# Patient Record
Sex: FEMALE | Race: WHITE | HISPANIC OR LATINO | Employment: OTHER | URBAN - METROPOLITAN AREA
[De-identification: names, ages, dates, MRNs, and addresses within clinical notes are randomized per-mention and may not be internally consistent; named-entity substitution may affect disease eponyms.]

---

## 2022-09-17 ENCOUNTER — HOSPITAL ENCOUNTER (EMERGENCY)
Facility: MEDICAL CENTER | Age: 77
End: 2022-09-17
Attending: EMERGENCY MEDICINE
Payer: MEDICARE

## 2022-09-17 ENCOUNTER — APPOINTMENT (OUTPATIENT)
Dept: RADIOLOGY | Facility: MEDICAL CENTER | Age: 77
End: 2022-09-17

## 2022-09-17 ENCOUNTER — APPOINTMENT (OUTPATIENT)
Dept: RADIOLOGY | Facility: MEDICAL CENTER | Age: 77
End: 2022-09-17
Attending: EMERGENCY MEDICINE

## 2022-09-17 VITALS
HEIGHT: 62 IN | OXYGEN SATURATION: 98 % | WEIGHT: 148.81 LBS | SYSTOLIC BLOOD PRESSURE: 193 MMHG | HEART RATE: 63 BPM | DIASTOLIC BLOOD PRESSURE: 90 MMHG | RESPIRATION RATE: 20 BRPM | BODY MASS INDEX: 27.38 KG/M2 | TEMPERATURE: 98.3 F

## 2022-09-17 DIAGNOSIS — R53.1 GENERALIZED WEAKNESS: ICD-10-CM

## 2022-09-17 DIAGNOSIS — R07.89 CHEST DISCOMFORT: ICD-10-CM

## 2022-09-17 DIAGNOSIS — R60.9 PERIPHERAL EDEMA: ICD-10-CM

## 2022-09-17 DIAGNOSIS — R63.0 DECREASED APPETITE: ICD-10-CM

## 2022-09-17 LAB
ALBUMIN SERPL BCP-MCNC: 4 G/DL (ref 3.2–4.9)
ALBUMIN/GLOB SERPL: 1.3 G/DL
ALP SERPL-CCNC: 87 U/L (ref 30–99)
ALT SERPL-CCNC: 22 U/L (ref 2–50)
ANION GAP SERPL CALC-SCNC: 16 MMOL/L (ref 7–16)
AST SERPL-CCNC: 24 U/L (ref 12–45)
BASOPHILS # BLD AUTO: 0.7 % (ref 0–1.8)
BASOPHILS # BLD: 0.05 K/UL (ref 0–0.12)
BILIRUB SERPL-MCNC: 0.3 MG/DL (ref 0.1–1.5)
BUN SERPL-MCNC: 17 MG/DL (ref 8–22)
CALCIUM SERPL-MCNC: 9.6 MG/DL (ref 8.5–10.5)
CHLORIDE SERPL-SCNC: 104 MMOL/L (ref 96–112)
CO2 SERPL-SCNC: 23 MMOL/L (ref 20–33)
CREAT SERPL-MCNC: 1.1 MG/DL (ref 0.5–1.4)
EKG IMPRESSION: NORMAL
EOSINOPHIL # BLD AUTO: 0.18 K/UL (ref 0–0.51)
EOSINOPHIL NFR BLD: 2.6 % (ref 0–6.9)
ERYTHROCYTE [DISTWIDTH] IN BLOOD BY AUTOMATED COUNT: 45.2 FL (ref 35.9–50)
GFR SERPLBLD CREATININE-BSD FMLA CKD-EPI: 52 ML/MIN/1.73 M 2
GLOBULIN SER CALC-MCNC: 3.1 G/DL (ref 1.9–3.5)
GLUCOSE SERPL-MCNC: 193 MG/DL (ref 65–99)
HCT VFR BLD AUTO: 39.3 % (ref 37–47)
HGB BLD-MCNC: 12.8 G/DL (ref 12–16)
IMM GRANULOCYTES # BLD AUTO: 0.05 K/UL (ref 0–0.11)
IMM GRANULOCYTES NFR BLD AUTO: 0.7 % (ref 0–0.9)
LIPASE SERPL-CCNC: 24 U/L (ref 11–82)
LYMPHOCYTES # BLD AUTO: 2.19 K/UL (ref 1–4.8)
LYMPHOCYTES NFR BLD: 31.5 % (ref 22–41)
MCH RBC QN AUTO: 28.8 PG (ref 27–33)
MCHC RBC AUTO-ENTMCNC: 32.6 G/DL (ref 33.6–35)
MCV RBC AUTO: 88.3 FL (ref 81.4–97.8)
MONOCYTES # BLD AUTO: 0.4 K/UL (ref 0–0.85)
MONOCYTES NFR BLD AUTO: 5.8 % (ref 0–13.4)
NEUTROPHILS # BLD AUTO: 4.08 K/UL (ref 2–7.15)
NEUTROPHILS NFR BLD: 58.7 % (ref 44–72)
NRBC # BLD AUTO: 0 K/UL
NRBC BLD-RTO: 0 /100 WBC
NT-PROBNP SERPL IA-MCNC: 1157 PG/ML (ref 0–125)
PLATELET # BLD AUTO: 228 K/UL (ref 164–446)
PMV BLD AUTO: 11.4 FL (ref 9–12.9)
POTASSIUM SERPL-SCNC: 4.2 MMOL/L (ref 3.6–5.5)
PROT SERPL-MCNC: 7.1 G/DL (ref 6–8.2)
RBC # BLD AUTO: 4.45 M/UL (ref 4.2–5.4)
SODIUM SERPL-SCNC: 143 MMOL/L (ref 135–145)
TROPONIN T SERPL-MCNC: 20 NG/L (ref 6–19)
TROPONIN T SERPL-MCNC: 21 NG/L (ref 6–19)
WBC # BLD AUTO: 7 K/UL (ref 4.8–10.8)

## 2022-09-17 PROCEDURE — 84484 ASSAY OF TROPONIN QUANT: CPT

## 2022-09-17 PROCEDURE — 80053 COMPREHEN METABOLIC PANEL: CPT

## 2022-09-17 PROCEDURE — 700105 HCHG RX REV CODE 258: Performed by: EMERGENCY MEDICINE

## 2022-09-17 PROCEDURE — 71045 X-RAY EXAM CHEST 1 VIEW: CPT

## 2022-09-17 PROCEDURE — 36415 COLL VENOUS BLD VENIPUNCTURE: CPT

## 2022-09-17 PROCEDURE — 83690 ASSAY OF LIPASE: CPT

## 2022-09-17 PROCEDURE — 85025 COMPLETE CBC W/AUTO DIFF WBC: CPT

## 2022-09-17 PROCEDURE — 93005 ELECTROCARDIOGRAM TRACING: CPT | Performed by: EMERGENCY MEDICINE

## 2022-09-17 PROCEDURE — 93005 ELECTROCARDIOGRAM TRACING: CPT

## 2022-09-17 PROCEDURE — 99285 EMERGENCY DEPT VISIT HI MDM: CPT

## 2022-09-17 PROCEDURE — 83880 ASSAY OF NATRIURETIC PEPTIDE: CPT

## 2022-09-17 RX ORDER — SODIUM CHLORIDE 9 MG/ML
500 INJECTION, SOLUTION INTRAVENOUS ONCE
Status: COMPLETED | OUTPATIENT
Start: 2022-09-17 | End: 2022-09-17

## 2022-09-17 RX ORDER — MIRTAZAPINE 30 MG/1
30 TABLET, FILM COATED ORAL EVERY EVENING
COMMUNITY

## 2022-09-17 RX ORDER — TELMISARTAN AND HYDROCHLORTHIAZIDE 80; 12.5 MG/1; MG/1
1 TABLET ORAL 2 TIMES DAILY
COMMUNITY

## 2022-09-17 RX ADMIN — SODIUM CHLORIDE 500 ML: 9 INJECTION, SOLUTION INTRAVENOUS at 16:34

## 2022-09-17 NOTE — ED PROVIDER NOTES
"ED Provider Note    CHIEF COMPLAINT  Chief Complaint   Patient presents with    Weakness     PT reports 3 days of worsening weakness because she feels bad and unable to eat.    Arm Pain     PT reports \"burning to B arms and chest\" x 3 days    Peripheral Edema     B LE swelling, pt has h/o PAD.       HPI  Maria Elana Reyes-Decorral is a 77 y.o. female who presents for evaluation of discomfort in the left side of her chest for the past 3 days.  She describes discomfort as a warmth sensation, it is not necessarily painful nor pressure nor sharp or achy or dull, it is a heat-like sensation.  It actually starts in her abdomen, radiates up through the left side of her chest into her left arm.  Is been present for the past 3 days, she had similar symptoms in the past most recently a month ago.  She is somewhat short of breath.  Over this timeframe she had increasing swelling of her legs with a history of vascular disease.  She is diabetic on metformin does not check her sugars regularly.  No fever.  No coughing.  She states that when she gets these episodes she feels generally weak and today is no exception.  No vomiting but states she has not had much to eat in the past 3 days she is just not hungry.  No diarrhea.  No burning or blood with urination.    REVIEW OF SYSTEMS  Negative for fever, rash, abdominal pain, nausea, vomiting, diarrhea, headache, focal weakness, focal numbness, focal tingling, back pain. All other systems are negative.     PAST MEDICAL HISTORY  No past medical history on file.    FAMILY HISTORY  No family history on file.    SOCIAL HISTORY       SURGICAL HISTORY  No past surgical history on file.    CURRENT MEDICATIONS  I personally reviewed the medication list in the charting documentation.     ALLERGIES  No Known Allergies    MEDICAL RECORD  I have reviewed patient's medical record and pertinent results are listed above.      PHYSICAL EXAM  VITAL SIGNS: BP (!) 179/85   Pulse 72   Temp 36.5 °C " "(97.7 °F) (Temporal)   Resp 18   Ht 1.575 m (5' 2\")   Wt 67.5 kg (148 lb 13 oz)   SpO2 97%   BMI 27.22 kg/m²    Constitutional: Elderly, generally well appearing patient in no acute distress.  Awake and alert, not toxic nor ill in appearance.  HENT: Normocephalic, no obvious evidence of acute trauma.  Eyes: No scleral icterus. Normal conjunctiva   Neck: Comfortable movement without any obvious restriction in the range of motion.  Cardiovascular: Upon ascultation I appreciate a regular heart rhythm and a normal rate.   Thorax & Lungs: Normal nonlabored respirations.  Upon application of the stethoscope for auscultation I find there to be no associated chest wall tenderness.  I appreciate no wheezing, rhonchi or rales. There is normal air movement.    Abdomen: The abdomen is not visibly distended. Upon palpation, I find it to be without tenderness.  No mass appreciated.  Skin: The exposed portions of skin reveal no obvious rash or other abnormalities.  Extremities/Musculoskeletal: Symmetric 1+ lower extremity pitting edema, no asymmetry, no erythema  Neurologic: Alert & oriented. No focal deficits observed.   Psychiatric: Normal affect appropriate for the clinical situation.    DIAGNOSTIC STUDIES / PROCEDURES    LABS/EKGs     Results for orders placed or performed during the hospital encounter of 09/17/22   CBC with Differential   Result Value Ref Range    WBC 7.0 4.8 - 10.8 K/uL    RBC 4.45 4.20 - 5.40 M/uL    Hemoglobin 12.8 12.0 - 16.0 g/dL    Hematocrit 39.3 37.0 - 47.0 %    MCV 88.3 81.4 - 97.8 fL    MCH 28.8 27.0 - 33.0 pg    MCHC 32.6 (L) 33.6 - 35.0 g/dL    RDW 45.2 35.9 - 50.0 fL    Platelet Count 228 164 - 446 K/uL    MPV 11.4 9.0 - 12.9 fL    Neutrophils-Polys 58.70 44.00 - 72.00 %    Lymphocytes 31.50 22.00 - 41.00 %    Monocytes 5.80 0.00 - 13.40 %    Eosinophils 2.60 0.00 - 6.90 %    Basophils 0.70 0.00 - 1.80 %    Immature Granulocytes 0.70 0.00 - 0.90 %    Nucleated RBC 0.00 /100 WBC    " Neutrophils (Absolute) 4.08 2.00 - 7.15 K/uL    Lymphs (Absolute) 2.19 1.00 - 4.80 K/uL    Monos (Absolute) 0.40 0.00 - 0.85 K/uL    Eos (Absolute) 0.18 0.00 - 0.51 K/uL    Baso (Absolute) 0.05 0.00 - 0.12 K/uL    Immature Granulocytes (abs) 0.05 0.00 - 0.11 K/uL    NRBC (Absolute) 0.00 K/uL   Complete Metabolic Panel (CMP)   Result Value Ref Range    Sodium 143 135 - 145 mmol/L    Potassium 4.2 3.6 - 5.5 mmol/L    Chloride 104 96 - 112 mmol/L    Co2 23 20 - 33 mmol/L    Anion Gap 16.0 7.0 - 16.0    Glucose 193 (H) 65 - 99 mg/dL    Bun 17 8 - 22 mg/dL    Creatinine 1.10 0.50 - 1.40 mg/dL    Calcium 9.6 8.5 - 10.5 mg/dL    AST(SGOT) 24 12 - 45 U/L    ALT(SGPT) 22 2 - 50 U/L    Alkaline Phosphatase 87 30 - 99 U/L    Total Bilirubin 0.3 0.1 - 1.5 mg/dL    Albumin 4.0 3.2 - 4.9 g/dL    Total Protein 7.1 6.0 - 8.2 g/dL    Globulin 3.1 1.9 - 3.5 g/dL    A-G Ratio 1.3 g/dL   Troponin   Result Value Ref Range    Troponin T 21 (H) 6 - 19 ng/L   ESTIMATED GFR   Result Value Ref Range    GFR (CKD-EPI) 52 (A) >60 mL/min/1.73 m 2   TROPONIN   Result Value Ref Range    Troponin T 20 (H) 6 - 19 ng/L   LIPASE   Result Value Ref Range    Lipase 24 11 - 82 U/L   proBrain Natriuretic Peptide, NT   Result Value Ref Range    NT-proBNP 1157 (H) 0 - 125 pg/mL   EKG   Result Value Ref Range    Report       [facility] Emergency Dept.    Test Date:  2022  Pt Name:    MARIA REYES-NUZHAT         Department: ER  MRN:        [id number]                      Room:  Gender:     Female                       Technician: [id number]  :        1945                   Requested By:ER TRIAGE PROTOCOL  Order #:    [id number]                    Reading MD: MIGUE VALERA MD    Measurements  Intervals                                Axis  Rate:       70                           P:          56  WI:         124                          QRS:        -14  QRSD:       102                          T:          89  QT:         390  QTc:         421    Interpretive Statements  12 Lead EKG interpreted by me to show: -- Rate 70 -- Rhythm: Normal sinus  rhythm  -- Axis: Normal -- NJ and QRS Intervals: Normal -- T waves: No acute changes  --  ST segments: No acute changes -- Ectopy: PVCs. My impression of this EKG:  Does  not indicate acute ischemia at this time.  Electronically Signed On 9-  16:43:20 PDT by MIGUE VALERA MD          RADIOLOGY     DX-CHEST-PORTABLE (1 VIEW)   Final Result      No acute cardiopulmonary disease.            COURSE & MEDICAL DECISION MAKING  I have reviewed any medical record information, laboratory studies and radiographic results as noted above.  Differential diagnoses includes: ACS, dehydration, electrolyte abnormalities, anemia, dehydration, DKA, CHF, pleural effusion, hepatitis, pancreatitis    Encounter Summary: This is a very pleasant 77 y.o. female who unfortunately required evaluation in the emergency department today with some vague symptoms including a warmth on the left side of her chest into her arm with decreased appetite and generalized weakness over the past 3 days.  Her vital signs reveal hypertension, otherwise unremarkable, on exam she has some peripheral edema but it is pretty mild, the rest of her exam is unrevealing.  Will obtain blood work, chest x-ray, she will receive some IV fluids and she will be reevaluated ------- her work-up here reveals an initially minimally elevated troponin at 21, this was repeated 2 hours later and was 20, this is not acute cardiac ischemia.  She has acute kidney injury with a GFR of 52 albeit with a normal BUN and creatinine.  Her BNP is 1100, chest x-ray reveals no pulmonary edema or obvious pleural effusions.  At this point, the patient is stable and appropriate to be discharged with no clear urgent or emergent etiologies explaining her symptoms.  She will follow-up with her primary care provider.      DISPOSITION: Discharged home in stable condition      FINAL  IMPRESSION  1. Chest discomfort    2. Peripheral edema    3. Generalized weakness    4. Decreased appetite           This dictation was created using voice recognition software. The accuracy of the dictation is limited to the abilities of the software. I expect there may be some errors of grammar and possibly content. The nursing notes were reviewed and certain aspects of this information were incorporated into this note.    Electronically signed by: Alex Key M.D., 9/17/2022 3:58 PM

## 2022-09-17 NOTE — ED NOTES
Med rec completed per patient and granddaughter at bedside with photos of patient's medications. Granddaughter translated for patient.  Allergies reviewed with patient and granddaughter. NKDA.  No outpatient antibiotics in the last 30 days.  Preferred [facility]: [facility].    Patient denies using insulin at home.

## 2022-09-17 NOTE — ED TRIAGE NOTES
"Chief Complaint   Patient presents with    Weakness     PT reports 3 days of worsening weakness because she feels bad and unable to eat.    Arm Pain     PT reports \"burning to B arms and chest\" x 3 days    Peripheral Edema     B LE swelling, pt has h/o PAD.     [id number].      Chest pain protocol initiated.    "

## 2022-09-18 NOTE — ED NOTES
Reviewed discharge instructions with patient and answered any questions. Patient discharged home and encouraged to follow up with PCP. Patient ambulating with steady gait.

## 2022-10-25 ENCOUNTER — HOSPITAL ENCOUNTER (EMERGENCY)
Facility: MEDICAL CENTER | Age: 77
End: 2022-10-25
Attending: EMERGENCY MEDICINE
Payer: MEDICARE

## 2022-10-25 VITALS
RESPIRATION RATE: 18 BRPM | DIASTOLIC BLOOD PRESSURE: 84 MMHG | HEART RATE: 84 BPM | SYSTOLIC BLOOD PRESSURE: 172 MMHG | TEMPERATURE: 98.4 F | OXYGEN SATURATION: 97 % | HEIGHT: 62 IN | BODY MASS INDEX: 27.71 KG/M2 | WEIGHT: 150.57 LBS

## 2022-10-25 DIAGNOSIS — I73.9 PVD (PERIPHERAL VASCULAR DISEASE) (HCC): ICD-10-CM

## 2022-10-25 DIAGNOSIS — G62.9 NEUROPATHY: ICD-10-CM

## 2022-10-25 PROCEDURE — 99284 EMERGENCY DEPT VISIT MOD MDM: CPT

## 2022-10-25 ASSESSMENT — FIBROSIS 4 INDEX: FIB4 SCORE: 1.73

## 2022-10-25 NOTE — ED PROVIDER NOTES
"ED Provider Note    CHIEF COMPLAINT  Chief Complaint   Patient presents with    Foot Pain     8/10 left foot pain that's been going on for \"awhile now.\" Pt has neuropathy in L foot with some sensation. Pt reports increase in foot pain especially at night. Pt has hx of DM foot ulcer. Pt denies trauma. No redness noted to foot or wound on foot. CMS intact.        HPI  Maria Elena Reyes-Decorral is a 77 y.o. female who presents with left foot pain.  Started least 3 months ago.  Patient developed a blister on her left second toe.  She was seen by  [facility].  She was given anti-inflammatories.  Ultimately this blister healed, but persistently has pain in the left foot.  This is from midfoot distal.  Feels a burning sensation there it is constant.  Seems somewhat worse with ambulation but also wakes her up at night and at rest.  She denies rash or swelling or skin change.  She has not had any trauma.  No fever.  States that the pain is better currently than it has been previously.    REVIEW OF SYSTEMS  As per HPI, otherwise a 10 point review of systems is negative    PAST MEDICAL HISTORY  Diabetes, high blood pressure    SOCIAL HISTORY  Social History     Tobacco Use    Smoking status: Never    Smokeless tobacco: Never   Substance Use Topics    Alcohol use: Not Currently    Drug use: Not Currently       SURGICAL HISTORY  History reviewed. No pertinent surgical history.    CURRENT MEDICATIONS  Home Medications       Reviewed by Adriane Mahoney R.N. (Registered Nurse) on 10/25/22 at 1033  Med List Status: Partial     Medication Last Dose Status   metFORMIN (GLUCOPHAGE) 500 MG Tab  Active   mirtazapine (REMERON) 30 MG Tab tablet  Active   Multiple Vitamins-Minerals (CENTRUM SILVER 50+WOMEN) Tab  Active   telmisartan-hydrochlorothiazide (MICARDIS HCT) 80-12.5 MG per tablet  Active                    ALLERGIES  No Known Allergies    PHYSICAL EXAM  VITAL SIGNS: BP (!) 176/82   Pulse 88   Temp 36.4 °C (97.6 °F) " "(Temporal)   Resp 16   Ht 1.575 m (5' 2\")   Wt 68.3 kg (150 lb 9.2 oz)   SpO2 98%   BMI 27.54 kg/m²    Constitutional: Awake and alert  HENT: Normal inspection  Eyes: Normal inspection  Neck: Grossly normal range of motion.  Cardiovascular: Normal heart rate, Normal rhythm.    Thorax & Lungs: No respiratory distress  Skin: Normal skin color of both feet.  Skin is warm.  Capillary refill about 2 set to 3 seconds.  Back: No tenderness, No CVA tenderness.   Extremities: Noted above.  No swelling.  No skin rash lesion ulcer.  Weakly palpable posterior tibial pulse that symmetric bilaterally.  Neurologic: Describes decreased sensation over the left forefoot.  Normal motor.  Psychiatric: Normal for situation        COURSE & MEDICAL DECISION MAKING  Presents to the ER with left foot pain.  History is most consistent with neuropathy.  This could be from diabetes or peripheral vascular disease.  She has has very weak pulses but no acute skin changes motor dysfunction or suggestion of acute arterial occlusion.  I will refer her to vascular surgery, Dr. Mckenna.  I will have her start taking an aspirin a day.  She should follow-up with her primary provider for further management and treatment of neuropathy and I discussed this with her using the  and her family member.  Patient is to return to the ER for any acute changes in her condition, skin change, swelling, increased pain or concern.    FINAL IMPRESSION  1.  Neuropathic pain  2.  Peripheral vascular disease without clinical suggestion of acute arterial occlusion      This dictation was created using voice recognition software. The accuracy of the dictation is limited to the abilities of the software.  The nursing notes were reviewed and certain aspects of this information were incorporated into this note.      Electronically signed by: Aj Partida M.D., 10/25/2022 11:39 AM    "

## 2022-10-25 NOTE — ED TRIAGE NOTES
"Chief Complaint   Patient presents with    Foot Pain     8/10 left foot pain that's been going on for \"awhile now.\" Pt has neuropathy in L foot with some sensation. Pt reports increase in foot pain especially at night. Pt has hx of DM foot ulcer. Pt denies trauma. No redness noted to foot or wound on foot. CMS intact.             used for triage (Juan ID# [id number]).    Pt ambulated to triage for above complaint.  Pt is AO x 4, follows commands, and responds appropriately to questions. Patient's breathing is unlabored and pain is currently 8/10 on the 0-10 pain scale.  Pt placed in lobby. Patient educated on triage process and encouraged to alert staff for any changes.      BP (!) 176/82   Pulse 88   Temp 36.4 °C (97.6 °F) (Temporal)   Resp 16   Ht 1.575 m (5' 2\")   Wt 68.3 kg (150 lb 9.2 oz)   SpO2 98%     "

## 2022-10-25 NOTE — ED NOTES
Discharge instructions reviewed with pt and family. Pt voices understanding of follow up with vascular surgery and knows to return for any concerning symptoms.       All translations done using language line .

## 2024-04-30 ENCOUNTER — OFFICE VISIT (OUTPATIENT)
Dept: URGENT CARE | Facility: PHYSICIAN GROUP | Age: 79
End: 2024-04-30
Payer: MEDICARE

## 2024-04-30 ENCOUNTER — HOSPITAL ENCOUNTER (EMERGENCY)
Facility: MEDICAL CENTER | Age: 79
End: 2024-04-30
Attending: EMERGENCY MEDICINE
Payer: MEDICARE

## 2024-04-30 ENCOUNTER — APPOINTMENT (OUTPATIENT)
Dept: RADIOLOGY | Facility: MEDICAL CENTER | Age: 79
End: 2024-04-30
Attending: EMERGENCY MEDICINE
Payer: MEDICARE

## 2024-04-30 VITALS
DIASTOLIC BLOOD PRESSURE: 83 MMHG | TEMPERATURE: 98 F | OXYGEN SATURATION: 95 % | SYSTOLIC BLOOD PRESSURE: 182 MMHG | BODY MASS INDEX: 27.44 KG/M2 | HEART RATE: 79 BPM | RESPIRATION RATE: 14 BRPM | WEIGHT: 150 LBS

## 2024-04-30 DIAGNOSIS — I10 HYPERTENSION, UNSPECIFIED TYPE: ICD-10-CM

## 2024-04-30 DIAGNOSIS — R07.9 CHEST PAIN, UNSPECIFIED TYPE: ICD-10-CM

## 2024-04-30 DIAGNOSIS — G44.89 OTHER HEADACHE SYNDROME: ICD-10-CM

## 2024-04-30 DIAGNOSIS — N39.0 ACUTE UTI: ICD-10-CM

## 2024-04-30 LAB
ALBUMIN SERPL BCP-MCNC: 3.9 G/DL (ref 3.2–4.9)
ALBUMIN/GLOB SERPL: 1.5 G/DL
ALP SERPL-CCNC: 76 U/L (ref 30–99)
ALT SERPL-CCNC: 21 U/L (ref 2–50)
ANION GAP SERPL CALC-SCNC: 13 MMOL/L (ref 7–16)
APPEARANCE UR: CLEAR
AST SERPL-CCNC: 19 U/L (ref 12–45)
BACTERIA #/AREA URNS HPF: ABNORMAL /HPF
BASOPHILS # BLD AUTO: 0.5 % (ref 0–1.8)
BASOPHILS # BLD: 0.03 K/UL (ref 0–0.12)
BILIRUB SERPL-MCNC: 0.3 MG/DL (ref 0.1–1.5)
BILIRUB UR QL STRIP.AUTO: NEGATIVE
BUN SERPL-MCNC: 11 MG/DL (ref 8–22)
CALCIUM ALBUM COR SERPL-MCNC: 9.9 MG/DL (ref 8.5–10.5)
CALCIUM SERPL-MCNC: 9.8 MG/DL (ref 8.5–10.5)
CHLORIDE SERPL-SCNC: 105 MMOL/L (ref 96–112)
CO2 SERPL-SCNC: 21 MMOL/L (ref 20–33)
COLOR UR: YELLOW
CREAT SERPL-MCNC: 0.73 MG/DL (ref 0.5–1.4)
EKG IMPRESSION: NORMAL
EOSINOPHIL # BLD AUTO: 0.09 K/UL (ref 0–0.51)
EOSINOPHIL NFR BLD: 1.5 % (ref 0–6.9)
EPI CELLS #/AREA URNS HPF: ABNORMAL /HPF
ERYTHROCYTE [DISTWIDTH] IN BLOOD BY AUTOMATED COUNT: 46 FL (ref 35.9–50)
GFR SERPLBLD CREATININE-BSD FMLA CKD-EPI: 84 ML/MIN/1.73 M 2
GLOBULIN SER CALC-MCNC: 2.6 G/DL (ref 1.9–3.5)
GLUCOSE SERPL-MCNC: 123 MG/DL (ref 65–99)
GLUCOSE UR STRIP.AUTO-MCNC: NEGATIVE MG/DL
HCT VFR BLD AUTO: 34.3 % (ref 37–47)
HGB BLD-MCNC: 11 G/DL (ref 12–16)
HYALINE CASTS #/AREA URNS LPF: ABNORMAL /LPF
IMM GRANULOCYTES # BLD AUTO: 0.09 K/UL (ref 0–0.11)
IMM GRANULOCYTES NFR BLD AUTO: 1.5 % (ref 0–0.9)
KETONES UR STRIP.AUTO-MCNC: NEGATIVE MG/DL
LEUKOCYTE ESTERASE UR QL STRIP.AUTO: ABNORMAL
LYMPHOCYTES # BLD AUTO: 1.53 K/UL (ref 1–4.8)
LYMPHOCYTES NFR BLD: 26.3 % (ref 22–41)
MCH RBC QN AUTO: 28.2 PG (ref 27–33)
MCHC RBC AUTO-ENTMCNC: 32.1 G/DL (ref 32.2–35.5)
MCV RBC AUTO: 87.9 FL (ref 81.4–97.8)
MICRO URNS: ABNORMAL
MONOCYTES # BLD AUTO: 0.28 K/UL (ref 0–0.85)
MONOCYTES NFR BLD AUTO: 4.8 % (ref 0–13.4)
MUCOUS THREADS #/AREA URNS HPF: ABNORMAL /HPF
NEUTROPHILS # BLD AUTO: 3.79 K/UL (ref 1.82–7.42)
NEUTROPHILS NFR BLD: 65.4 % (ref 44–72)
NITRITE UR QL STRIP.AUTO: NEGATIVE
NRBC # BLD AUTO: 0 K/UL
NRBC BLD-RTO: 0 /100 WBC (ref 0–0.2)
PH UR STRIP.AUTO: 7 [PH] (ref 5–8)
PLATELET # BLD AUTO: 197 K/UL (ref 164–446)
PMV BLD AUTO: 11.4 FL (ref 9–12.9)
POTASSIUM SERPL-SCNC: 3.9 MMOL/L (ref 3.6–5.5)
PROT SERPL-MCNC: 6.5 G/DL (ref 6–8.2)
PROT UR QL STRIP: NEGATIVE MG/DL
RBC # BLD AUTO: 3.9 M/UL (ref 4.2–5.4)
RBC # URNS HPF: ABNORMAL /HPF
RBC UR QL AUTO: NEGATIVE
SODIUM SERPL-SCNC: 139 MMOL/L (ref 135–145)
SP GR UR STRIP.AUTO: 1.01
TRANS CELLS #/AREA URNS HPF: ABNORMAL /HPF
TROPONIN T SERPL-MCNC: 20 NG/L (ref 6–19)
TROPONIN T SERPL-MCNC: 22 NG/L (ref 6–19)
UROBILINOGEN UR STRIP.AUTO-MCNC: 0.2 MG/DL
WBC # BLD AUTO: 5.8 K/UL (ref 4.8–10.8)
WBC #/AREA URNS HPF: ABNORMAL /HPF

## 2024-04-30 RX ORDER — TELMISARTAN 80 MG/1
80 TABLET ORAL ONCE
Status: COMPLETED | OUTPATIENT
Start: 2024-04-30 | End: 2024-04-30

## 2024-04-30 RX ORDER — CEFTRIAXONE 1 G/1
1000 INJECTION, POWDER, FOR SOLUTION INTRAMUSCULAR; INTRAVENOUS ONCE
Status: COMPLETED | OUTPATIENT
Start: 2024-04-30 | End: 2024-04-30

## 2024-04-30 RX ORDER — HYDRALAZINE HYDROCHLORIDE 20 MG/ML
10 INJECTION INTRAMUSCULAR; INTRAVENOUS ONCE
Status: COMPLETED | OUTPATIENT
Start: 2024-04-30 | End: 2024-04-30

## 2024-04-30 RX ORDER — ONDANSETRON 2 MG/ML
4 INJECTION INTRAMUSCULAR; INTRAVENOUS ONCE
Status: COMPLETED | OUTPATIENT
Start: 2024-04-30 | End: 2024-04-30

## 2024-04-30 RX ORDER — ATORVASTATIN CALCIUM 20 MG/1
20 TABLET, FILM COATED ORAL EVERY EVENING
COMMUNITY
Start: 2024-04-29

## 2024-04-30 RX ORDER — PREGABALIN 75 MG/1
75 CAPSULE ORAL
COMMUNITY

## 2024-04-30 RX ORDER — CEPHALEXIN 500 MG/1
500 CAPSULE ORAL 4 TIMES DAILY
Qty: 28 CAPSULE | Refills: 0 | Status: ACTIVE | OUTPATIENT
Start: 2024-04-30 | End: 2024-05-07

## 2024-04-30 RX ORDER — TELMISARTAN 80 MG/1
80 TABLET ORAL DAILY
Qty: 30 TABLET | Refills: 3 | Status: SHIPPED | OUTPATIENT
Start: 2024-04-30

## 2024-04-30 RX ORDER — MORPHINE SULFATE 4 MG/ML
4 INJECTION INTRAVENOUS ONCE
Status: COMPLETED | OUTPATIENT
Start: 2024-04-30 | End: 2024-04-30

## 2024-04-30 RX ADMIN — CEFTRIAXONE SODIUM 1000 MG: 1 INJECTION, POWDER, FOR SOLUTION INTRAMUSCULAR; INTRAVENOUS at 21:09

## 2024-04-30 RX ADMIN — MORPHINE SULFATE 4 MG: 4 INJECTION INTRAVENOUS at 18:32

## 2024-04-30 RX ADMIN — TELMISARTAN 80 MG: 80 TABLET ORAL at 18:59

## 2024-04-30 RX ADMIN — HYDRALAZINE HYDROCHLORIDE 10 MG: 20 INJECTION INTRAMUSCULAR; INTRAVENOUS at 21:09

## 2024-04-30 RX ADMIN — ONDANSETRON 4 MG: 2 INJECTION INTRAMUSCULAR; INTRAVENOUS at 18:32

## 2024-04-30 ASSESSMENT — FIBROSIS 4 INDEX: FIB4 SCORE: 1.750490092182906723

## 2024-05-01 NOTE — ED PROVIDER NOTES
ED Provider Note    CHIEF COMPLAINT  Chief Complaint   Patient presents with    Chest Pain     Began this morning. Bilateral foot pain x 3 years.     Headache     Began this morning. Dizziness began today.        EXTERNAL RECORDS REVIEWED  Outpatient Notes   urgent care, ER    HPI/ROS  LIMITATION TO HISTORY   Pashto  OUTSIDE HISTORIAN(S):  None    Maria Elena Reyes Decorral is a 78 y.o. female who presents here for evaluation of chest pain, headache, and high blood pressure.  Patient states that she has had chest pain intermittently for the last couple of days, and that she has had this in the past.  She has not seen a doctor for the same.  She has no associated shortness of breath, diaphoresis, or nausea.  Patient states that she has a mild headache as well, but has not taken her head antihypertensive in 2 days secondary to running out.  Patient also complains of left foot pain that is been bothering her for 2 years.  She denies any fall or trauma, she has not taken any medications prior to arrival exam.    PAST MEDICAL HISTORY   No bleeding disorders    SURGICAL HISTORY  patient denies any surgical history    FAMILY HISTORY  History reviewed. No pertinent family history.    SOCIAL HISTORY  Social History     Tobacco Use    Smoking status: Never    Smokeless tobacco: Never   Substance and Sexual Activity    Alcohol use: Not Currently    Drug use: Not Currently    Sexual activity: Not on file       CURRENT MEDICATIONS  Home Medications    **Home medications have not yet been reviewed for this encounter**         ALLERGIES  No Known Allergies    PHYSICAL EXAM  VITAL SIGNS: BP (!) 188/89   Pulse 78   Temp 36.6 °C (97.8 °F) (Temporal)   Resp 16   Wt 68 kg (150 lb)   SpO2 98%   BMI 27.44 kg/m²    Constitutional: Well developed, well nourished. No acute distress.  HEENT: Normocephalic, atraumatic. Posterior pharynx clear and moist.  Eyes:  EOMI. Normal sclera.  Neck: Supple, Full range of motion,  nontender.  Chest/Pulmonary: clear to ausculation. Symmetrical expansion.   Cardio: Regular rate and rhythm with no murmur.  Musculoskeletal: No deformity, no edema, neurovascular intact.   Neuro: Clear speech, appropriate, cooperative, cranial nerves II-XII grossly intact.  Psych: Normal mood and affect      EKG/LABS  Results for orders placed or performed during the hospital encounter of 04/30/24   CBC with Differential   Result Value Ref Range    WBC 5.8 4.8 - 10.8 K/uL    RBC 3.90 (L) 4.20 - 5.40 M/uL    Hemoglobin 11.0 (L) 12.0 - 16.0 g/dL    Hematocrit 34.3 (L) 37.0 - 47.0 %    MCV 87.9 81.4 - 97.8 fL    MCH 28.2 27.0 - 33.0 pg    MCHC 32.1 (L) 32.2 - 35.5 g/dL    RDW 46.0 35.9 - 50.0 fL    Platelet Count 197 164 - 446 K/uL    MPV 11.4 9.0 - 12.9 fL    Neutrophils-Polys 65.40 44.00 - 72.00 %    Lymphocytes 26.30 22.00 - 41.00 %    Monocytes 4.80 0.00 - 13.40 %    Eosinophils 1.50 0.00 - 6.90 %    Basophils 0.50 0.00 - 1.80 %    Immature Granulocytes 1.50 (H) 0.00 - 0.90 %    Nucleated RBC 0.00 0.00 - 0.20 /100 WBC    Neutrophils (Absolute) 3.79 1.82 - 7.42 K/uL    Lymphs (Absolute) 1.53 1.00 - 4.80 K/uL    Monos (Absolute) 0.28 0.00 - 0.85 K/uL    Eos (Absolute) 0.09 0.00 - 0.51 K/uL    Baso (Absolute) 0.03 0.00 - 0.12 K/uL    Immature Granulocytes (abs) 0.09 0.00 - 0.11 K/uL    NRBC (Absolute) 0.00 K/uL   Complete Metabolic Panel (CMP)   Result Value Ref Range    Sodium 139 135 - 145 mmol/L    Potassium 3.9 3.6 - 5.5 mmol/L    Chloride 105 96 - 112 mmol/L    Co2 21 20 - 33 mmol/L    Anion Gap 13.0 7.0 - 16.0    Glucose 123 (H) 65 - 99 mg/dL    Bun 11 8 - 22 mg/dL    Creatinine 0.73 0.50 - 1.40 mg/dL    Calcium 9.8 8.5 - 10.5 mg/dL    Correct Calcium 9.9 8.5 - 10.5 mg/dL    AST(SGOT) 19 12 - 45 U/L    ALT(SGPT) 21 2 - 50 U/L    Alkaline Phosphatase 76 30 - 99 U/L    Total Bilirubin 0.3 0.1 - 1.5 mg/dL    Albumin 3.9 3.2 - 4.9 g/dL    Total Protein 6.5 6.0 - 8.2 g/dL    Globulin 2.6 1.9 - 3.5 g/dL    A-G Ratio  1.5 g/dL   Troponins NOW   Result Value Ref Range    Troponin T 20 (H) 6 - 19 ng/L   Troponins in two (2) hours   Result Value Ref Range    Troponin T 22 (H) 6 - 19 ng/L   ESTIMATED GFR   Result Value Ref Range    GFR (CKD-EPI) 84 >60 mL/min/1.73 m 2   URINALYSIS,CULTURE IF INDICATED    Specimen: Urine, Clean Catch   Result Value Ref Range    Color Yellow     Character Clear     Specific Gravity 1.012 <1.035    Ph 7.0 5.0 - 8.0    Glucose Negative Negative mg/dL    Ketones Negative Negative mg/dL    Protein Negative Negative mg/dL    Bilirubin Negative Negative    Urobilinogen, Urine 0.2 Negative    Nitrite Negative Negative    Leukocyte Esterase Moderate (A) Negative    Occult Blood Negative Negative    Micro Urine Req Microscopic    URINE MICROSCOPIC (W/UA)   Result Value Ref Range    WBC 5-10 (A) /hpf    RBC 0-2 /hpf    Bacteria Few (A) None /hpf    Epithelial Cells Rare /hpf    Trans Epithelial Cells Rare /hpf    Mucous Threads Few /hpf    Hyaline Cast 0-2 /lpf   EKG (NOW)   Result Value Ref Range    Report       [facility] Emergency Dept.    Test Date:  2024  Pt Name:    MARIA REYES DECORRAL         Department: ER  MRN:        [id number]                      Room:  Gender:     Female                       Technician: [id number]  :        1945                   Requested By:ER TRIAGE PROTOCOL  Order #:    [id number]                    Reading MD:    Measurements  Intervals                                Axis  Rate:       62                           P:          60  NM:         123                          QRS:        28  QRSD:       95                           T:          3  QT:         409  QTc:        416    Interpretive Statements  Sinus rhythm  Borderline T abnormalities, inferior leads  Compared to ECG 2022 14:27:19  T-wave abnormality now present  Ventricular premature complex(es) no longer present  Possible ischemia no longer present        EKG; normal sinus rhythm rate of  62.  No ST elevation, ST depression.  QTc is 416.  Compared to EKG from 9/17/2022.    RADIOLOGY/PROCEDURES   I have independently interpreted the diagnostic imaging associated with this visit and am waiting the final reading from the radiologist.   My preliminary interpretation is as follows: See below    Radiologist interpretation:  CT-HEAD W/O   Final Result      1.  Cerebral atrophy.      2.  White matter lucencies most consistent with small vessel ischemic change versus demyelination or gliosis.      3.  Otherwise, Head CT without contrast with no acute findings. No evidence of acute cerebral infarction, hemorrhage or mass lesion.         DX-FOOT-COMPLETE 3+ LEFT   Final Result      1.  Osteopenia.   2.  No acute fracture or dislocation is noted. No erosive or destructive lesion.      DX-CHEST-PORTABLE (1 VIEW)   Final Result      No acute cardiac or pulmonary abnormalities are identified.          COURSE & MEDICAL DECISION MAKING    ASSESSMENT, COURSE AND PLAN  Care Narrative: This is a 78-year-old female here for evaluation of headache, hypertension, chest pressure, and chronic left foot pain.  Patient has 2 troponins at her baseline level, unremarkable EKG, negative chest x-ray.  She is negative x-ray of the foot.  Patient has history of hypertension, and has been medically noncompliant over the last 2 days secondary to running out of her medication.  I did refill this, and provide her with outpatient follow-up.  Instructions are provided to her daughters as well.  Patient has heart score of 3      DISPOSITION AND DISCUSSIONS  I have discussed management of the patient with the following physicians and ROMA's: None    Discussion of management with other QHP or appropriate source(s): None    Escalation of care considered, and ultimately not performed: No IV fluids indicated    Barriers to care at this time, including but not limited to: Patient does not have established PCP.     Decision tools and prescription  drugs considered including, but not limited to: Keflex.    FINAL DIAGNOSIS  1. Chest pain, unspecified type    2. Other headache syndrome    3. Hypertension, unspecified type    4. Acute UTI           Electronically signed by: Steve Salazar D.O., 4/30/2024 6:09 PM

## 2024-05-01 NOTE — ED NOTES
Pt provided discharge instructions, and prescription. Pt verbalized understanding of all instructions. IV removed, cathlon intact, site w/out s/s of infection. Pt to lobby via wheelchiar.

## 2024-05-01 NOTE — ED NOTES
Med Rec complete per patient and family with medication bottles at bedside   Allergies reviewed  Antibiotics in the past 30 days:no  Anticoagulant in past 14 days:no  Pharmacy patient utilizes:[facility] [address]

## 2024-05-01 NOTE — ED NOTES
Bedside report from URSULA Winters. Pt on gurney in lowest, locked position, on RA, and continuous cardiac monitoring. Fall precautions in place, including fall risk sign. Pt updated on plan of care. No needs at this time. Call light within reach.